# Patient Record
Sex: MALE | URBAN - METROPOLITAN AREA
[De-identification: names, ages, dates, MRNs, and addresses within clinical notes are randomized per-mention and may not be internally consistent; named-entity substitution may affect disease eponyms.]

---

## 2017-04-11 ENCOUNTER — TELEPHONE (OUTPATIENT)
Dept: AUDIOLOGY | Facility: CLINIC | Age: 52
End: 2017-04-11

## 2017-04-11 NOTE — TELEPHONE ENCOUNTER
Pavel Cabezas was seen at the [facility] on 4/11/17 for hearing aid services.  He requested the tubing be replaced on his left hearing aid.  The left earmold tubing and left hearing aid tonehook were replaced and the hearing aid was found to be functioning well.  Mr. Cabezas reported a comfortable fit and good sound quality after today's work.  He reported he accidentally stepped on and broke his right hearing aid.  He was advised to bring it to the clinic to see if it could be repaired.  He may also schedule an appointment to be evaluated for new hearing aids as well.  Otherwise, he will return to the clinic as needed.

## 2017-04-26 ENCOUNTER — OFFICE VISIT (OUTPATIENT)
Dept: AUDIOLOGY | Facility: CLINIC | Age: 52
End: 2017-04-26

## 2017-04-26 DIAGNOSIS — H90.3 SENSORY HEARING LOSS, BILATERAL: Primary | ICD-10-CM

## 2017-04-26 NOTE — MR AVS SNAPSHOT
After Visit Summary   4/26/2017    Pavel Cabezas    MRN: [id number]           Patient Information     Date Of Birth          1965        Visit Information        Provider Department      4/26/2017 9:00 AM [facility]ita [facility]         Follow-ups after your visit        Your next 10 appointments already scheduled     Jun 26, 2017  8:00 AM CDT   H[facility] Consult with [facility] ([facility])    [address] [place] [id number] [phone]            Jul 17, 2017 10:00 AM CDT   Hearing Aid Fitting with Justyn Rees [facility] ([facility])    [address] [place] [address] [id number] [phone]            Aug 07, 2017 10:00 AM CDT   (Arrive by 9:45 AM)   Initial Review Program with Justyn Rees [facility] Audiology ([facility])    [address] [place] [id number] [phone]              Who to contact     Please call your clinic at [phone] to:    Ask questions about your health    Make or cancel appointments    Discuss your medicines    Learn about your test results    Speak to your doctor   If you have compliments or concerns about an experience at your clinic, or if you wish to file a complaint, please contact [facility] Patient Relations at [phone] or email us at [email or web address]         Additional Information About Your Visit        MyChart Information     Jotvine.com is an electronic gateway that provides easy, online access to your medical records. With Jotvine.com, you can request a clinic appointment, read your test results, renew a prescription or communicate with your care team.     To sign up for [email or web address] visit the website at [email or web address]   You will be asked to enter the access code listed  below, as well as some personal information. Please follow the directions to create your username and password.     Your access code is: [email or web address]  Expires: 2017  6:30 AM     Your access code will  in 90 days. If you need help or a new code, please contact your [facility] or call [phone] for assistance.        Care EveryWhere ID     This is your Care EveryWhere ID. This could be used by other organizations to access your [place] medical records  [id number]         Blood Pressure from Last 3 Encounters:   No data found for BP    Weight from Last 3 Encounters:   No data found for Wt              Today, you had the following     No orders found for display       Primary Care Provider Office Phone # Fax #    [facility] [phone]       [address] [place] [address]        Thank you!     Thank you for choosing [facility]UDIOLOG[facility]  for your care. Our goal is always to provide you with excellent care. Hearing back from our patients is one way we can continue to improve our services. Please take a few minutes to complete the written survey that you may receive in the mail after your visit with us. Thank you!             Your Updated Medication List - Protect others around you: Learn how to safely use, store and throw away your medicines at www.disposemymeds.org.      Notice  As of 2017 10:17 AM    You have not been prescribed any medications.

## 2017-04-26 NOTE — PROGRESS NOTES
AUDIOLOGY REPORT    SUBJECTIVE:  Pavel Cabezas is a 51 year old male who was seen in Audiology at the [facility] 4/26/2017 audiologic evaluation.  The patient has been seen previously in this clinic on 4/17/2012 for assessment and results indicated a moderate bilateral sensorineural hearing loss which he has had since childhood.  No noted change in hearing.  The patient reports ongoing tinnitus.  His right hearing aid broke about 6 months ago, and he has not been wearing it since. The patient denies problems with dizziness. He reports frequent problems with cerumen. He had bladder cancer a few years ago.  The patient notes difficulty with communication in a variety of listening situations. He is interested in purchasing new hearing aids as well as repairing his broken right device.    OBJECTIVE:  Otoscopic exam indicates left ear partially occluded with cerumen,right ear clear.     Pure Tone Thresholds assessed using conventional audiometry with good  reliability from 250-8000 Hz bilaterally using insert earphones and circumaural headphones     RIGHT:  mild sloping to moderately severe sensorineural hearing loss    LEFT:    moderate to moderately severe  sensorineural hearing loss    Tympanogram:    RIGHT: normal eardrum mobility    LEFT:   normal eardrum mobility    Reflexes (reported by stimulus ear):  RIGHT: Ipsilateral is present at normal levels  RIGHT: Contralateral is elevated  LEFT:   Ipsilateral is present at normal levels  LEFT:   Contralateral is elevated      Speech Reception Threshold:    RIGHT: 60 dB HL    LEFT:   55 dB HL  Word Recognition Score:     RIGHT: 92% at 95 dB HL using NU-6 recorded word list.    LEFT:   92% at 85 dB HL using NU-6 recorded word list.      ASSESSMENT:   Compared to patient's previous audiogram dated 4/17/2012, hearing has remained stable. Today s results were discussed with the patient in detail.     PLAN:  Patient will be  scheduled for a hearing aid consult. His insurance covered part of his hearing aids previously, and investigation will be made for this set.  His right hearing aid will be repaired and sent.  At the consult current hearing aids will be checked. Please call this clinic with questions regarding these results or recommendations.      Beni Wilder, CCC-A  Licensed Audiologist  [place] #[id number]

## 2017-07-17 ENCOUNTER — OFFICE VISIT (OUTPATIENT)
Dept: AUDIOLOGY | Facility: CLINIC | Age: 52
End: 2017-07-17

## 2017-07-17 DIAGNOSIS — H90.3 SENSORY HEARING LOSS, BILATERAL: Primary | ICD-10-CM

## 2017-07-17 NOTE — PROGRESS NOTES
AUDIOLOGY REPORT    SUBJECTIVE: Pavel Cabezas is a 51 year old male was seen in the Audiology Clinic at  [facility] on 7/17/17 to discuss concerns with hearing and functional communication difficulties. Pavel has been seen previously on 4/26/2107, and results revealed a a bilateral moderate sensorineural hearing loss, that has been stable since childhood.   Pavel notes difficulty with communication in a variety of listening situations.    OBJECTIVE:  Patient is a hearing aid candidate. Patient would like to move forward with a hearing aid evaluation today. Therefore, the patient was presented with different options for amplification to help aid in communication. Discussed styles, levels of technology and monaural vs. binaural fitting.     The hearing aids mutually chosen were:  Binaural: Phonak Bolero BPR90  COLOR: P3 Sandelwoor  BATTERY SIZE: Rechargeable  EARMOLD/TIPS: canal lock    Otoscopy revealed partial obstruction with cerumen bilaterally. Bilateral earmolds were taken without incident.    ASSESSMENT:   Reviewed purchase information and warranty information with patient. The 45 day trial period was explained to patient. The patient was given a copy of the [place] Department of Health consumer brochure on purchasing hearing instruments. Patient risk factors have been provided to the patient in writing prior to the sale of the hearing aid per FDA regulation. The risk factors are also available in the User Instructional Booklet to be presented on the day of the hearing aid fitting. Hearing aid(s) ordered. Hearing aid evaluation completed.    PLAN: Pavel is scheduled to return September 12 for a hearing aid fitting and programming. Purchase agreement will be completed on that date. Please contact this clinic with any questions or concerns.        Beni Wilder, CCC-A  Licensed Audiologist  [place] #[id number]

## 2017-07-17 NOTE — MR AVS SNAPSHOT
After Visit Summary   7/17/2017    Pavel Cabezas    MRN: [id number]           Patient Information     Date Of Birth          1965        Visit Information        Provider Department      7/17/2017 10:00 AM [facility]         Follow-ups after your visit        Your next 10 appointments already scheduled     Sep 12, 2017 11:00 AM CDT   [facility] F[facility] ([facility])    [address] [place] [id number] [phone]            Sep 12, 2017  5:00 PM CDT   Hearing Aid Fitting with Justyn Rees [facility] ([facility])    [address] [place] [id number] [phone]            Oct 02, 2017  2:00 PM CDT   (Arrive by 1:45 PM)   Initial Review Program with Justyn Rees [facility] ([facility])    [address] [place] [id number] [phone]              Who to contact     Please call your clinic at [phone] to:    Ask questions about your health    Make or cancel appointments    Discuss your medicines    Learn about your test results    Speak to your doctor   If you have compliments or concerns about an experience at your clinic, or if you wish to file a complaint, please contact [facility] Patient Relations at [phone] or email us at [email or web address]         Additional Information About Your Visit        MyChart Information     miiCard is an electronic gateway that provides easy, online access to your medical records. With miiCard, you can request a clinic appointment, read your test results, renew a prescription or communicate with your care team.     To sign up for miiCard visit the website at [email or web address]   You will be asked to enter the access code  listed below, as well as some personal information. Please follow the directions to create your username and password.     Your access code is: [email or web address]  Expires: 10/15/2017 11:06 AM     Your access code will  in 90 days. If you need help or a new code, please contact your [facility] or call [phone] for assistance.        Care EveryWhere ID     This is your Care EveryWhere ID. This could be used by other organizations to access your [place] medical records  [id number]         Blood Pressure from Last 3 Encounters:   No data found for BP    Weight from Last 3 Encounters:   No data found for Wt              Today, you had the following     No orders found for display       Primary Care Provider Office Phone # Fax #    [facility] [phone]       [address] [place] [address]        Equal Access to Services     LUIS MAGUIRE : Hadii ezio ramirez hadasho Soleslyeali, waaxda luqadaha, qaybta kaalmada adeegyada, john herin haygal anguiano . So [facility] [phone].    ATENCIÓN: Si habla español, tiene a de la fuente disposición servicios gratuitos de asistencia lingüística. Llame al [phone].    We comply with applicable federal civil rights laws and [place] laws. We do not discriminate on the basis of race, color, national origin, age, disability sex, sexual orientation or gender identity.            Thank you!     Thank you for choosing [facility] AUDIOLOGY  for your care. Our goal is always to provide you with excellent care. Hearing back from our patients is one way we can continue to improve our services. Please take a few minutes to complete the written survey that you may receive in the mail after your visit with us. Thank you!             Your Updated Medication List - Protect others around you: Learn how to safely use, store and throw away your medicines at www.disposemymeds.org.      Notice  As of 2017 11:06 AM    You  have not been prescribed any medications.

## 2017-12-20 ENCOUNTER — OFFICE VISIT (OUTPATIENT)
Dept: AUDIOLOGY | Facility: CLINIC | Age: 52
End: 2017-12-20
Payer: COMMERCIAL

## 2017-12-20 ENCOUNTER — OFFICE VISIT (OUTPATIENT)
Dept: AUDIOLOGY | Facility: CLINIC | Age: 52
End: 2017-12-20

## 2017-12-20 DIAGNOSIS — H90.3 SENSORY HEARING LOSS, BILATERAL: Primary | ICD-10-CM

## 2017-12-20 NOTE — PROGRESS NOTES
AUDIOLOGY REPORT    SUBJECTIVE: Pavel Cabezas is a 52 year old male was seen in Audiology at the [facility] [place] on 12/20/2017 for a fitting of binaural Phonak Bolero B90 [place] and earmolds. Previous 4/26/2017 results have revealed a bilateral moderate to severe  sensorineural hearing loss.     OBJECTIVE: The hearing aid conformity evaluation was completed.The hearing aids were placed and they provided a good fit. Real-ear-probe-microphone measurements were completed on the MD Lingo system and were a good match to NAL-NL1 target with soft sounds audible, moderate sounds comfortable, and loud sounds below discomfort. UCLs are verified through maximum power output measures and demonstrate appropriate limiting of loud inputs. Pavel was oriented to proper hearing aid use, care, cleaning (no water, dry brush), batteries (size rechargeable, insertion/removal, toxicity, low-battery signal), aid insertion/removal, user booklet, warranty information, storage cases, and other hearing aid details. The patient confirmed understanding of hearing aid use and care, and showed proper insertion of hearing aid and batteries while in the office today.Pavel reported good volume and sound quality today.   Hearing aids were programmed as follows:  Program 1:Universal  Program 2:  Speech in Noise for car  Program 3: Duo Phone left    ASSESSMENT: Binaural  hearing aids, earmolds and  were fit today. Verification measures were performed. Pavel signed the Hearing Aid Purchase Agreement and was given a copy, as well as details on his hearing aids.    PLAN:Pavel will return for follow-up in 2-3 weeks for a hearing aid review appointment. Please call this clinic with questions regarding today s appointment.      Beni Wilder, CCC-A  Licensed Audiologist  [place] #[id number]

## 2017-12-20 NOTE — MR AVS SNAPSHOT
After Visit Summary   2017    Pavel Cabezas    MRN: [id number]           Patient Information     Date Of Birth          1965        Visit Information        Provider Department      2017 10:00 AM Chuyita Chua, Justyn CARRILLO [facility]        Today's Diagnoses     Sensory hearing loss, bilateral    -  1       Follow-ups after your visit        Who to contact     Please call your clinic at [phone] to:    Ask questions about your health    Make or cancel appointments    Discuss your medicines    Learn about your test results    Speak to your doctor   If you have compliments or concerns about an experience at your clinic, or if you wish to file a complaint, please contact [facility] Patient Relations at [phone] or email us at [email or web address]         Additional Information About Your Visit        MyChart Information     MediaInterface [place] is an electronic gateway that provides easy, online access to your medical records. With MediaInterface D[place], you can request a clinic appointment, read your test results, renew a prescription or communicate with your care team.     To sign up for MEMSICt visit the website at [email or web address]   You will be asked to enter the access code listed below, as well as some personal information. Please follow the directions to create your username and password.     Your access code is: [email or web address]  Expires: 3/6/2018  6:30 AM     Your access code will  in 90 days. If you need help or a new code, please contact your [facility] or call [phone] for assistance.        Care EveryWhere ID     This is your Care EveryWhere ID. This could be used by other organizations to access your [place] medical records  [id number]         Blood Pressure from Last 3 Encounters:   No data found for BP    Weight from Last 3 Encounters:   No data found for Wt              We Performed the  Following     Ear Mold/Insert, Nondisposable, Any type ()     Hearing Aid Fitting        Primary Care Provider Office Phone # Fax #    [facility] [phone]       [address] [place] [address]        Equal Access to Services     LUIS MAGUIRE : Hadii ezio ramirez hadasho Soomaali, waaxda luqadaha, qaybta kaalmada adeegyada, waxmarva ness ramonamatthew benitokadehaja fields. So [facility] [phone].    ATENCIÓN: Si habla español, tiene a de la fuente disposición servicios gratuitos de asistencia lingüística. Llame al [phone].    We comply with applicable federal civil rights laws and [place] laws. We do not discriminate on the basis of race, color, national origin, age, disability, sex, sexual orientation, or gender identity.            Thank you!     Thank you for choosing [facility] AUDIOLOGY  for your care. Our goal is always to provide you with excellent care. Hearing back from our patients is one way we can continue to improve our services. Please take a few minutes to complete the written survey that you may receive in the mail after your visit with us. Thank you!             Your Updated Medication List - Protect others around you: Learn how to safely use, store and throw away your medicines at www.disposemymeds.org.      Notice  As of 12/20/2017 12:50 PM    You have not been prescribed any medications.

## 2017-12-20 NOTE — PROGRESS NOTES
AUDIOLOGY REPORT    SUBJECTIVE: Pavel Cabezas is a 52 year old male was seen in Audiology at the [facility] [place] on 12/20/2017 for a fitting of binaural Phonak Bolero B90 [place] and earmolds. Previous 4/26/2017 results have revealed a bilateral moderate to severe  sensorineural hearing loss.     OBJECTIVE: The hearing aid conformity evaluation was completed.The hearing aids were placed and they provided a good fit. Real-ear-probe-microphone measurements were completed on the V[employer] international system and were a good match to NAL-NL1 target with soft sounds audible, moderate sounds comfortable, and loud sounds below discomfort. UCLs are verified through maximum power output measures and demonstrate appropriate limiting of loud inputs. Pavel was oriented to proper hearing aid use, care, cleaning (no water, dry brush), batteries (size rechargeable, insertion/removal, toxicity, low-battery signal), aid insertion/removal, user booklet, warranty information, storage cases, and other hearing aid details. The patient confirmed understanding of hearing aid use and care, and showed proper insertion of hearing aid and batteries while in the office today.Pavel reported good volume and sound quality today.   Hearing aids were programmed as follows:  Program 1:Universal  Program 2:  Speech in Noise for car  Program 3: Duo Phone left    ASSESSMENT: Binaural  hearing aids, earmolds and  were fit today. Verification measures were performed. Pavel signed the Hearing Aid Purchase Agreement and was given a copy, as well as details on his hearing aids.    PLAN:Pavel will return for follow-up in 2-3 weeks for a hearing aid review appointment. Please call this clinic with questions regarding today s appointment.      Beni Wilder, CCC-A  Licensed Audiologist  [place] #[id number]

## 2017-12-20 NOTE — MR AVS SNAPSHOT
After Visit Summary   2017    Pavel Cabezas    MRN: [id number]           Patient Information     Date Of Birth          1965        Visit Information        Provider Department      2017 10:05 AM Chuyita Chua, Justyn CARRILLO [facility]iology        Today's Diagnoses     Sensory hearing loss, bilateral    -  1       Follow-ups after your visit        Who to contact     Please call your clinic at [phone] to:    Ask questions about your health    Make or cancel appointments    Discuss your medicines    Learn about your test results    Speak to your doctor   If you have compliments or concerns about an experience at your clinic, or if you wish to file a complaint, please contact [facility] Patient Relations at [phone] or email us at [email or web address]         Additional Information About Your Visit        MyChart Information     Summize is an electronic gateway that provides easy, online access to your medical records. With Summize, you can request a clinic appointment, read your test results, renew a prescription or communicate with your care team.     To sign up for Trig Medicalt visit the website at [email or web address]   You will be asked to enter the access code listed below, as well as some personal information. Please follow the directions to create your username and password.     Your access code is: [email or web address]  Expires: 3/6/2018  6:30 AM     Your access code will  in 90 days. If you need help or a new code, please contact your [facility] or call [phone] for assistance.        Care EveryWhere ID     This is your Care EveryWhere ID. This could be used by other organizations to access your [place] medical records  [id number]         Blood Pressure from Last 3 Encounters:   No data found for BP    Weight from Last 3 Encounters:   No data found for Wt              We Performed the  Following     FM/Assistive Listening Device Fitting        Primary Care Provider Office Phone # Fax #    [facility] [phone]       [address] [place] [address]        Equal Access to Services     LUIS MAGUIRE : Hadii aad ku hadbillieo Soleslyeali, waaxda luqadaha, qaybta kaalmada adeegyada, john greenen marilylaney serna annmarie fields. So [facility] [phone].    ATENCIÓN: Si habla español, tiene a de la fuente disposición servicios gratuitos de asistencia lingüística. Llame al [phone].    We comply with applicable federal civil rights laws and [place] laws. We do not discriminate on the basis of race, color, national origin, age, disability, sex, sexual orientation, or gender identity.            Thank you!     Thank you for choosing [facility] AUDIOLOGY  for your care. Our goal is always to provide you with excellent care. Hearing back from our patients is one way we can continue to improve our services. Please take a few minutes to complete the written survey that you may receive in the mail after your visit with us. Thank you!             Your Updated Medication List - Protect others around you: Learn how to safely use, store and throw away your medicines at www.disposemymeds.org.      Notice  As of 12/20/2017 12:51 PM    You have not been prescribed any medications.

## 2018-04-27 ENCOUNTER — TELEPHONE (OUTPATIENT)
Dept: AUDIOLOGY | Facility: CLINIC | Age: 53
End: 2018-04-27

## 2018-04-27 NOTE — TELEPHONE ENCOUNTER
Pavel Cabezas was seen at the [facility] on 4/27/18 for hearing aid services.  He requested the tubing be replaced on his earmolds.  His hearing aids and earmolds were cleaned.  Hearing aid tonehooks and earmold tubing were replaced bilaterally.  Both hearing aids were found to be working well.  Mr. Cabezas reported a comfortable fit and good sound quality after today's work.  He will return to the clinic as needed.

## 2019-03-25 ENCOUNTER — TELEPHONE (OUTPATIENT)
Dept: AUDIOLOGY | Facility: CLINIC | Age: 54
End: 2019-03-25

## 2019-03-25 NOTE — TELEPHONE ENCOUNTER
Pavel Cabezas was seen at the [facility] on 3/25/19 for hearing aid services.  His earmolds and hearing aids were cleaned, earmold tubing and hearing aid tonehooks were replaced bilaterally.  Both devices were found to be working normally.  Mr. Cabezas reported a comfortable fit and good sound quality after today's work.  He will return to the clinic as needed.

## 2020-10-12 ENCOUNTER — TELEPHONE (OUTPATIENT)
Dept: AUDIOLOGY | Facility: CLINIC | Age: 55
End: 2020-10-12

## 2020-10-12 NOTE — TELEPHONE ENCOUNTER
Pavel Cabezas was seen at the [facility] on 10/12/20 for hearing aid services.  His earmold tubing and hearing aid tonehooks were replaced today.  Both hearing aids were found to be working normally.  Mr. Cabezas reported a comfortable fit and good sound quality.  He will return to the clinic as needed.

## 2021-03-25 ENCOUNTER — TELEPHONE (OUTPATIENT)
Dept: AUDIOLOGY | Facility: CLINIC | Age: 56
End: 2021-03-25

## 2021-03-25 NOTE — TELEPHONE ENCOUNTER
Walk-in hearing aid services on 3/25/21: Mr. Cabezas asked to have his left earmold tubing replaced.  Both left and right hearing aids and earmolds were cleaned.  Earmold tubing and hearing aid tonehooks were replaced bilaterally.  The hearing aids were found to be working normally.  Mr. Cabezas reported a comfortable fit and good sound quality.  He will return to the clinic as needed.

## 2021-08-27 ENCOUNTER — TELEPHONE (OUTPATIENT)
Dept: AUDIOLOGY | Facility: CLINIC | Age: 56
End: 2021-08-27

## 2021-08-27 NOTE — TELEPHONE ENCOUNTER
Walk-in hearing aid services on 8/27/21: The patient's hearing aids had stopped working due to moisture in the tonehooks.  The hearing aids and earmolds were cleaned and the tonehooks and earmold tubing were replaced.  Afterwards both hearing aids were found to be working normally.  Mr. Cabezas reported a comfortable fit and good sound quality after today's work.  He will return to the clinic as needed.

## 2022-09-07 ENCOUNTER — TELEPHONE (OUTPATIENT)
Dept: AUDIOLOGY | Facility: CLINIC | Age: 57
End: 2022-09-07

## 2022-09-07 NOTE — TELEPHONE ENCOUNTER
Walk-in hearing aid services on 9/7/22: The patient asked to have his hearing aids cleaned and checked.  He indicated possible feedback with his left hearing aid.  Both hearing aids and earmolds were cleaned.  The earmold tubing and hearing aid tonehooks were replaced.  Afterwards both hearing aids were found to be working normally and were returned to the patient.  He was advised to have his left ear checked for possible wax build up and to schedule an appointment to get a new earmold if his ear is clear and he still experiences feedback.

## 2023-07-13 ENCOUNTER — TELEPHONE (OUTPATIENT)
Dept: AUDIOLOGY | Facility: CLINIC | Age: 58
End: 2023-07-13
Payer: COMMERCIAL

## 2023-07-13 NOTE — TELEPHONE ENCOUNTER
Walk-in hearing aid services on 7/13/23: The patient asked to have his hearing aids cleaned and checked.  Both hearing aids and earmolds were cleaned and the tonehooks and tubing were replaced.  Afterwards the hearing aids were found to be working properly and were returned to the patient.